# Patient Record
Sex: MALE | Race: WHITE | HISPANIC OR LATINO | Employment: UNEMPLOYED | URBAN - METROPOLITAN AREA
[De-identification: names, ages, dates, MRNs, and addresses within clinical notes are randomized per-mention and may not be internally consistent; named-entity substitution may affect disease eponyms.]

---
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
## 2020-09-06 ENCOUNTER — HOSPITAL ENCOUNTER (EMERGENCY)
Facility: CLINIC | Age: 20
Discharge: HOME | End: 2020-09-06
Attending: EMERGENCY MEDICINE | Admitting: EMERGENCY MEDICINE

## 2020-09-06 ENCOUNTER — APPOINTMENT (OUTPATIENT)
Dept: ULTRASOUND IMAGING | Facility: CLINIC | Age: 20
End: 2020-09-06
Attending: EMERGENCY MEDICINE

## 2020-09-06 VITALS
DIASTOLIC BLOOD PRESSURE: 69 MMHG | RESPIRATION RATE: 18 BRPM | SYSTOLIC BLOOD PRESSURE: 114 MMHG | HEART RATE: 81 BPM | BODY MASS INDEX: 23.57 KG/M2 | OXYGEN SATURATION: 98 % | HEIGHT: 68 IN | TEMPERATURE: 97.9 F

## 2020-09-06 DIAGNOSIS — N45.1 EPIDIDYMITIS, RIGHT: ICD-10-CM

## 2020-09-06 LAB
ALBUMIN UR-MCNC: NEGATIVE MG/DL
ANION GAP SERPL CALCULATED.3IONS-SCNC: 3 MMOL/L (ref 3–14)
APPEARANCE UR: CLEAR
BASOPHILS # BLD AUTO: 0 10E9/L (ref 0–0.2)
BASOPHILS NFR BLD AUTO: 0.3 %
BILIRUB UR QL STRIP: NEGATIVE
BUN SERPL-MCNC: 17 MG/DL (ref 7–30)
CALCIUM SERPL-MCNC: 8.4 MG/DL (ref 8.5–10.1)
CHLORIDE SERPL-SCNC: 111 MMOL/L (ref 94–109)
CO2 SERPL-SCNC: 26 MMOL/L (ref 20–32)
COLOR UR AUTO: YELLOW
CREAT SERPL-MCNC: 1.21 MG/DL (ref 0.66–1.25)
DIFFERENTIAL METHOD BLD: NORMAL
EOSINOPHIL # BLD AUTO: 0.2 10E9/L (ref 0–0.7)
EOSINOPHIL NFR BLD AUTO: 1.7 %
ERYTHROCYTE [DISTWIDTH] IN BLOOD BY AUTOMATED COUNT: 12 % (ref 10–15)
GFR SERPL CREATININE-BSD FRML MDRD: 84 ML/MIN/{1.73_M2}
GLUCOSE SERPL-MCNC: 118 MG/DL (ref 70–99)
GLUCOSE UR STRIP-MCNC: NEGATIVE MG/DL
HCT VFR BLD AUTO: 43.3 % (ref 40–53)
HGB BLD-MCNC: 15 G/DL (ref 13.3–17.7)
HGB UR QL STRIP: NEGATIVE
IMM GRANULOCYTES # BLD: 0 10E9/L (ref 0–0.4)
IMM GRANULOCYTES NFR BLD: 0.2 %
KETONES UR STRIP-MCNC: NEGATIVE MG/DL
LEUKOCYTE ESTERASE UR QL STRIP: NEGATIVE
LYMPHOCYTES # BLD AUTO: 2.2 10E9/L (ref 0.8–5.3)
LYMPHOCYTES NFR BLD AUTO: 22.7 %
MCH RBC QN AUTO: 29.6 PG (ref 26.5–33)
MCHC RBC AUTO-ENTMCNC: 34.6 G/DL (ref 31.5–36.5)
MCV RBC AUTO: 85 FL (ref 78–100)
MONOCYTES # BLD AUTO: 0.4 10E9/L (ref 0–1.3)
MONOCYTES NFR BLD AUTO: 3.9 %
MUCOUS THREADS #/AREA URNS LPF: PRESENT /LPF
NEUTROPHILS # BLD AUTO: 7 10E9/L (ref 1.6–8.3)
NEUTROPHILS NFR BLD AUTO: 71.2 %
NITRATE UR QL: NEGATIVE
NRBC # BLD AUTO: 0 10*3/UL
NRBC BLD AUTO-RTO: 0 /100
PH UR STRIP: 6.5 PH (ref 5–7)
PLATELET # BLD AUTO: 218 10E9/L (ref 150–450)
POTASSIUM SERPL-SCNC: 3.8 MMOL/L (ref 3.4–5.3)
RBC # BLD AUTO: 5.07 10E12/L (ref 4.4–5.9)
RBC #/AREA URNS AUTO: 0 /HPF (ref 0–2)
SODIUM SERPL-SCNC: 140 MMOL/L (ref 133–144)
SOURCE: ABNORMAL
SP GR UR STRIP: 1.02 (ref 1–1.03)
UROBILINOGEN UR STRIP-MCNC: NORMAL MG/DL (ref 0–2)
WBC # BLD AUTO: 9.9 10E9/L (ref 4–11)
WBC #/AREA URNS AUTO: 0 /HPF (ref 0–5)

## 2020-09-06 PROCEDURE — 85025 COMPLETE CBC W/AUTO DIFF WBC: CPT | Performed by: EMERGENCY MEDICINE

## 2020-09-06 PROCEDURE — 96360 HYDRATION IV INFUSION INIT: CPT

## 2020-09-06 PROCEDURE — 93976 VASCULAR STUDY: CPT

## 2020-09-06 PROCEDURE — 81001 URINALYSIS AUTO W/SCOPE: CPT | Performed by: EMERGENCY MEDICINE

## 2020-09-06 PROCEDURE — 25800030 ZZH RX IP 258 OP 636: Performed by: EMERGENCY MEDICINE

## 2020-09-06 PROCEDURE — 99284 EMERGENCY DEPT VISIT MOD MDM: CPT | Mod: 25

## 2020-09-06 PROCEDURE — 80048 BASIC METABOLIC PNL TOTAL CA: CPT | Performed by: EMERGENCY MEDICINE

## 2020-09-06 RX ORDER — DOXYCYCLINE 100 MG/1
100 CAPSULE ORAL 2 TIMES DAILY
Qty: 20 CAPSULE | Refills: 0 | Status: SHIPPED | OUTPATIENT
Start: 2020-09-06 | End: 2020-09-16

## 2020-09-06 RX ORDER — IBUPROFEN 600 MG/1
600 TABLET, FILM COATED ORAL EVERY 6 HOURS PRN
Qty: 21 TABLET | Refills: 0 | Status: SHIPPED | OUTPATIENT
Start: 2020-09-06

## 2020-09-06 RX ADMIN — SODIUM CHLORIDE 1000 ML: 9 INJECTION, SOLUTION INTRAVENOUS at 18:16

## 2020-09-06 ASSESSMENT — ENCOUNTER SYMPTOMS
FLANK PAIN: 1
HEMATURIA: 0
VOMITING: 0
DYSURIA: 0
DIARRHEA: 1
FREQUENCY: 0

## 2020-09-06 NOTE — ED TRIAGE NOTES
One month of low right sided stomach pain, but now for the past four day the pain is severe and radiating to his back.

## 2020-09-06 NOTE — ED PROVIDER NOTES
"  History     Chief Complaint:  Flank Pain    HPI   Yamile Boucher is a 19 year old male who presents with flank pain. The patient reports 1 month ago he developed right flank pain and was seen at the hospital in [place], [place] and discharged with pills. He notes 2 weeks ago the pain started worsening and 1 week ago it became unbearable. He states the pain is worsened with deep breathing. He endorses right testicle pain and a few episodes of diarrhea daily. He denies hematuria, frequency, dysuria, urgency, and vomiting. He denies any recent use of antibiotics.     Allergies:  No known drug allergies      Medications:    The patient is not currently taking any prescribed medications.     Past Medical History:    Dermatitis      Past Surgical History:    History reviewed. No pertinent surgical history.     Family History:    History reviewed. No pertinent family history.      Social History:  Smoking status- never smoker  Alcohol use- no  Drug use- none   Marital Status:  Single    Review of Systems   Gastrointestinal: Positive for diarrhea. Negative for vomiting.   Genitourinary: Positive for flank pain and testicular pain. Negative for dysuria, frequency, hematuria and urgency.   All other systems reviewed and are negative.    Physical Exam     Patient Vitals for the past 24 hrs:   BP Temp Temp src Pulse Resp SpO2 Height   09/06/20 1754 (!) 140/73 97.9  F (36.6  C) Temporal 81 18 99 % 1.727 m (5' 8\")       Physical Exam  General:  Sitting on bed.   HENT:  No obvious trauma to head  Right Ear:  External ear normal.   Left Ear:  External ear normal.   Nose:  Nose normal.   Eyes:  Conjunctivae and EOM are normal. Pupils are equal, round, and reactive.   Neck: Normal range of motion. Neck supple. No tracheal deviation present.   CV:  Normal heart sounds. No murmur heard.  Pulm/Chest: Effort normal and breath sounds normal.   Abd: Soft. No distension. There is no tenderness. There is no rigidity, no rebound and " no guarding.   M/S: Normal range of motion.   Neuro: Alert. GCS 15.  Skin: Skin is warm and dry. No rash noted. Not diaphoretic.   Psych: Normal mood and affect. Behavior is normal.    : Uncircumcised male. Bilateral testicle distended. Slight epididymal tenderness.  No testicular tenderness or mass appreciated.    Emergency Department Course     Imaging:  Radiology findings were communicated with the patient who voiced understanding of the findings.    US Testicular & scrotum w Doppler Ltd  1.  Mild hyperemia of the right epididymis suggesting epididymitis.  2.  Small left hydrocele.  Reading per radiology    Laboratory:  Laboratory findings were communicated with the patient who voiced understanding of the findings.    CBC: WNL (WBC 9.9, HGB 15.0, )  BMP: Chloride 111 (H), Glucose 118 (H), Calcium 8.4 (L), o/w WNL (Creatinine 1.21)     UA with Microscopic: Mucous - present o/w negative    Interventions:  1816 NS 1L IV Bolus     Emergency Department Course:  Past medical records, nursing notes, and vitals reviewed.    1759 I performed an exam of the patient as documented above.      1823 IV was inserted and blood was drawn for laboratory testing, results above.    1849 The patient was sent for a US testicular and scrotum while in the emergency department, results above.    1857 The patient provided a urine sample here in the emergency department. This was sent for laboratory testing, findings above.    1919 I rechecked the patient and discussed the results of their workup thus far.     Findings and plan explained to the Patient. Patient discharged home with instructions regarding supportive care, medications, and reasons to return. The importance of close follow-up was reviewed.    Impression & Plan   Medical Decision Making:  Yamile Boucher is a very pleasant 19 year old male who presents for evaluation of testicular pain. Broad differential diagnosis was considered including torsion, epididymitis,  UTI, sexually transmitted infection, hernia, varicocele, trauma, hematoma, etc. in 4 months.  He had a CT performed at outside hospital in [place], [place].  They are not on [facility].  We cannot get records on this holiday weekend.  He reports a CT with contrast was performed and found to be normal.  He has no fever or leukocytosis and appendicitis is unlikely.  The workup here is consistent with epididymitis.  He has not been sexually active for over 6 months.  He has no concern for STIs.  Reviewed the risk and benefit of testing and/or empiric treatment and he declined.  Given age and risk factors, will use doxycycline for antibiotics to cover common organisms.  Urine shows no signs of infection. Questions were answered.  Stable at discharge.    The treatment plan was discussed with the patient and they expressed understanding of this plan and consented to the plan.  In addition, the patient will return to the emergency department if their symptoms persist, worsen, if new symptoms arise or if there is any concern as other pathology may be present that is not evident at this time. They also understand the importance of close follow up in the clinic and if unable to do so will return to the emergency department for a reevaluation. All questions were answered.    Diagnosis:    ICD-10-CM   1. Epididymitis, right  N45.1     Disposition:  Discharged to home.    Discharge Medications:  New Prescriptions    DOXYCYCLINE HYCLATE (VIBRAMYCIN) 100 MG CAPSULE    Take 1 capsule (100 mg) by mouth 2 times daily for 10 days    IBUPROFEN (ADVIL/MOTRIN) 600 MG TABLET    Take 1 tablet (600 mg) by mouth every 6 hours as needed for moderate pain       Scribe Disclosure:  I, Es Pryor, am serving as a scribe at 6:00 PM on 9/6/2020 to document services personally performed by Sanya Bay DO based on my observations and the provider's statements to me.        Sanya Bay DO  09/06/20 1925

## 2020-09-06 NOTE — ED NOTES
Bed: ED09  Expected date: 9/6/20  Expected time: 5:52 PM  Means of arrival:   Comments:  Triage-MARGARITO

## 2023-05-29 ENCOUNTER — HOSPITAL ENCOUNTER (EMERGENCY)
Facility: CLINIC | Age: 23
Discharge: HOME | End: 2023-05-29
Attending: EMERGENCY MEDICINE | Admitting: EMERGENCY MEDICINE
Payer: COMMERCIAL

## 2023-05-29 ENCOUNTER — APPOINTMENT (OUTPATIENT)
Dept: GENERAL RADIOLOGY | Facility: CLINIC | Age: 23
End: 2023-05-29
Attending: EMERGENCY MEDICINE
Payer: COMMERCIAL

## 2023-05-29 VITALS
DIASTOLIC BLOOD PRESSURE: 98 MMHG | OXYGEN SATURATION: 100 % | TEMPERATURE: 97.2 F | BODY MASS INDEX: 23.49 KG/M2 | RESPIRATION RATE: 24 BRPM | HEART RATE: 72 BPM | HEIGHT: 68 IN | WEIGHT: 155 LBS | SYSTOLIC BLOOD PRESSURE: 141 MMHG

## 2023-05-29 DIAGNOSIS — E86.0 MILD DEHYDRATION: ICD-10-CM

## 2023-05-29 DIAGNOSIS — I49.3 PVC'S (PREMATURE VENTRICULAR CONTRACTIONS): ICD-10-CM

## 2023-05-29 LAB
ANION GAP SERPL CALCULATED.3IONS-SCNC: 11 MMOL/L (ref 7–15)
ATRIAL RATE - MUSE: 78 BPM
BASOPHILS # BLD AUTO: 0 10E3/UL (ref 0–0.2)
BASOPHILS NFR BLD AUTO: 1 %
BUN SERPL-MCNC: 18.8 MG/DL (ref 6–20)
CALCIUM SERPL-MCNC: 9.6 MG/DL (ref 8.6–10)
CHLORIDE SERPL-SCNC: 105 MMOL/L (ref 98–107)
CREAT SERPL-MCNC: 1.34 MG/DL (ref 0.67–1.17)
DEPRECATED HCO3 PLAS-SCNC: 23 MMOL/L (ref 22–29)
DIASTOLIC BLOOD PRESSURE - MUSE: NORMAL MMHG
EOSINOPHIL # BLD AUTO: 0.1 10E3/UL (ref 0–0.7)
EOSINOPHIL NFR BLD AUTO: 1 %
ERYTHROCYTE [DISTWIDTH] IN BLOOD BY AUTOMATED COUNT: 11.7 % (ref 10–15)
GFR SERPL CREATININE-BSD FRML MDRD: 76 ML/MIN/1.73M2
GLUCOSE SERPL-MCNC: 98 MG/DL (ref 70–99)
HCT VFR BLD AUTO: 44.5 % (ref 40–53)
HGB BLD-MCNC: 15.2 G/DL (ref 13.3–17.7)
IMM GRANULOCYTES # BLD: 0.1 10E3/UL
IMM GRANULOCYTES NFR BLD: 1 %
INTERPRETATION ECG - MUSE: NORMAL
LYMPHOCYTES # BLD AUTO: 2.3 10E3/UL (ref 0.8–5.3)
LYMPHOCYTES NFR BLD AUTO: 30 %
MCH RBC QN AUTO: 28.8 PG (ref 26.5–33)
MCHC RBC AUTO-ENTMCNC: 34.2 G/DL (ref 31.5–36.5)
MCV RBC AUTO: 84 FL (ref 78–100)
MONOCYTES # BLD AUTO: 0.6 10E3/UL (ref 0–1.3)
MONOCYTES NFR BLD AUTO: 8 %
NEUTROPHILS # BLD AUTO: 4.7 10E3/UL (ref 1.6–8.3)
NEUTROPHILS NFR BLD AUTO: 59 %
NRBC # BLD AUTO: 0 10E3/UL
NRBC BLD AUTO-RTO: 0 /100
P AXIS - MUSE: 64 DEGREES
PLATELET # BLD AUTO: 203 10E3/UL (ref 150–450)
POTASSIUM SERPL-SCNC: 4.4 MMOL/L (ref 3.4–5.3)
PR INTERVAL - MUSE: 148 MS
QRS DURATION - MUSE: 66 MS
QT - MUSE: 334 MS
QTC - MUSE: 380 MS
R AXIS - MUSE: 67 DEGREES
RBC # BLD AUTO: 5.28 10E6/UL (ref 4.4–5.9)
SODIUM SERPL-SCNC: 139 MMOL/L (ref 136–145)
SYSTOLIC BLOOD PRESSURE - MUSE: NORMAL MMHG
T AXIS - MUSE: 57 DEGREES
TROPONIN T SERPL HS-MCNC: <6 NG/L
VENTRICULAR RATE- MUSE: 78 BPM
WBC # BLD AUTO: 7.7 10E3/UL (ref 4–11)

## 2023-05-29 PROCEDURE — 36415 COLL VENOUS BLD VENIPUNCTURE: CPT | Performed by: EMERGENCY MEDICINE

## 2023-05-29 PROCEDURE — 84484 ASSAY OF TROPONIN QUANT: CPT | Performed by: EMERGENCY MEDICINE

## 2023-05-29 PROCEDURE — 93005 ELECTROCARDIOGRAM TRACING: CPT

## 2023-05-29 PROCEDURE — 93005 ELECTROCARDIOGRAM TRACING: CPT | Mod: 76

## 2023-05-29 PROCEDURE — 82310 ASSAY OF CALCIUM: CPT | Performed by: EMERGENCY MEDICINE

## 2023-05-29 PROCEDURE — 85025 COMPLETE CBC W/AUTO DIFF WBC: CPT | Performed by: EMERGENCY MEDICINE

## 2023-05-29 PROCEDURE — 71046 X-RAY EXAM CHEST 2 VIEWS: CPT

## 2023-05-29 PROCEDURE — 99285 EMERGENCY DEPT VISIT HI MDM: CPT | Mod: 25

## 2023-05-29 ASSESSMENT — ACTIVITIES OF DAILY LIVING (ADL): ADLS_ACUITY_SCORE: 35

## 2023-05-29 NOTE — ED PROVIDER NOTES
"  History     Chief Complaint:  Shortness of Breath and Palpitations       HPI   Yamile Boucher is a 22 year old male who presents with shortness of breath and palpitations. The patient states that for the past 2 weeks he states he has been feeling lightheaded and short of breath. Along with that he has had more headaches and chest pressure than normal. He states that this does feel like some anxiety he has had as he has to manually breath at times and make himself slow down. He has had more stress in his life recently as he just graduated and is trying to figure out what to do in the future. He denies any dysuria, sore throat, runny nose, cough, or ear pain. He drinks 2 beers on the weekend occasionally but otherwise no alcohol, tobacco, or drugs. He has none coffee a day but no added energy drinks or supplements. He also states that he works out regularly. He has no PCP follow up.      Independent Historian:   None - Patient Only    Review of External Notes:          Medications:    The patient is currently on no regular medications.    Past Medical History:    No pertinent medical history    Past Surgical History:    No pertinent surgical history     Physical Exam     Patient Vitals for the past 24 hrs:   BP Temp Temp src Pulse Resp SpO2 Height Weight   05/29/23 1620 126/75 -- -- 81 14 100 % -- --   05/29/23 1542 (!) 143/57 97.2  F (36.2  C) Temporal 75 16 99 % 1.727 m (5' 8\") 70.3 kg (155 lb)        Physical Exam  Gen: well appearing, in no acute distress  Oral : Mucous membranes moist,   Nose: No rhinorhea  Ears: External near normal, without drainage  Eyes: periorbital tissues and sclera normal   Neck: supple, no abnormal swelling  Lungs: Clear bilaterally, no tachypnea or distress, speaks full sentences  CV: Regular rate, regular rhythm  Abd: soft, nontender, nondistended, no rebound/guarding  Ext: no lower extremity edema  Skin: warm, dry, well perfused, no rashes/bruising/lesions on exposed " skin  Neuro: alert, no gross motor or sensory deficits,   Psych: pleasant mood, normal affect    Emergency Department Course     ECG:  ECG results from 05/29/23   EKG 12-lead, tracing only     Value    Systolic Blood Pressure     Diastolic Blood Pressure     Ventricular Rate 78    Atrial Rate 78    SC Interval 148    QRS Duration 66        QTc 380    P Axis 64    R AXIS 67    T Axis 57    Interpretation ECG      Sinus rhythm with Premature supraventricular complexes  Otherwise normal ECG  When compared with ECG of 18-NOV-2014 18:55,  Premature ventricular complexes are no longer Present  Premature supraventricular complexes are now Present         Imaging:  Chest XR,  PA & LAT   Final Result   IMPRESSION: Negative chest.         Report per radiology    Laboratory:  Labs Ordered and Resulted from Time of ED Arrival to Time of ED Departure   BASIC METABOLIC PANEL - Abnormal       Result Value    Sodium 139      Potassium 4.4      Chloride 105      Carbon Dioxide (CO2) 23      Anion Gap 11      Urea Nitrogen 18.8      Creatinine 1.34 (*)     Calcium 9.6      Glucose 98      GFR Estimate 76     TROPONIN T, HIGH SENSITIVITY - Normal    Troponin T, High Sensitivity <6     CBC WITH PLATELETS AND DIFFERENTIAL    WBC Count 7.7      RBC Count 5.28      Hemoglobin 15.2      Hematocrit 44.5      MCV 84      MCH 28.8      MCHC 34.2      RDW 11.7      Platelet Count 203      % Neutrophils 59      % Lymphocytes 30      % Monocytes 8      % Eosinophils 1      % Basophils 1      % Immature Granulocytes 1      NRBCs per 100 WBC 0      Absolute Neutrophils 4.7      Absolute Lymphocytes 2.3      Absolute Monocytes 0.6      Absolute Eosinophils 0.1      Absolute Basophils 0.0      Absolute Immature Granulocytes 0.1      Absolute NRBCs 0.0        Emergency Department Course & Assessments:    Assessments:  1610 Obtained the patients history and performed initial exam  1712 Rechecked the patient and updated him on  findings    Independent Interpretation (X-rays, CTs, rhythm strip):  X-ray reviewed negative for infiltrate or pneumothorax    Social Determinants of Health affecting care:   None    Disposition:  The patient was discharged to home.     Impression & Plan      Medical Decision Making:  Patient presents emergency department with some nonspecific vague complaints.  Symptoms are not consistent with ACS, PE or dissection in my opinion.  He does have a few PVCs on his exam that could cause him some occasional shortness of breath and occasional chest pressure.  Patient is young and healthy otherwise.  His creatinine is very minorly elevated, he is only drinking about 4 12-16 bottles of water throughout the day and spends a fair amount of time outside on his trampoline.  I wonder if he may be a touch on the dry side.  I Jannie recommend he up his water intake which may help with his PVCs as well.  Discussed how this type of arrhythmia is minor and typically does not cause any serious problems.  He is not seen by primary care provider so I will request a follow-up for recheck in the near future.  Patient and his family seem comfortable with management plan.    Diagnosis:    ICD-10-CM    1. PVC's (premature ventricular contractions)  I49.3 Primary Care Referral      2. Mild dehydration  E86.0            Scribe Disclosure:  I, Andrea Contreras, am serving as a scribe at 4:08 PM on 5/29/2023 to document services personally performed by Artur Linton MD based on my observations and the provider's statements to me.     5/29/2023   Artur Linton MD Tschetter, Paul Anthony, MD  05/29/23 9820

## 2023-05-29 NOTE — ED TRIAGE NOTES
Pt reports for the past few weeks he has been feeling weak, anxious, SOB on exertion, and having palpitations. Pt reports palpitations come and go.      Triage Assessment     Row Name 05/29/23 7034       Triage Assessment (Adult)    Airway WDL WDL       Respiratory WDL    Respiratory WDL WDL       Cardiac WDL    Cardiac WDL --  Pt reports feeling of palpitations    Pulse Rate & Regularity radial pulse irregular       Cognitive/Neuro/Behavioral WDL    Cognitive/Neuro/Behavioral WDL WDL

## 2023-10-26 NOTE — ED AVS SNAPSHOT
Emergency Department  [address] [facility] [address]  Phone:  [phone]  Fax:  [phone]                                    Yamile Boucher   MRN: [id number]    Department:   Emergency Department   Date of Visit:  9/6/2020           After Visit Summary Signature Page    I have received my discharge instructions, and my questions have been answered. I have discussed any challenges I see with this plan with the nurse or doctor.    ..........................................................................................................................................  Patient/Patient Representative Signature      ..........................................................................................................................................  Patient Representative Print Name and Relationship to Patient    ..................................................               ................................................  Date                                   Time    ..........................................................................................................................................  Reviewed by Signature/Title    ...................................................              ..............................................  Date                                               Time          22EPIC Rev 08/18        no